# Patient Record
Sex: FEMALE | URBAN - METROPOLITAN AREA
[De-identification: names, ages, dates, MRNs, and addresses within clinical notes are randomized per-mention and may not be internally consistent; named-entity substitution may affect disease eponyms.]

---

## 2019-04-19 ENCOUNTER — OFFICE VISIT (OUTPATIENT)
Dept: URBAN - METROPOLITAN AREA CLINIC 22 | Facility: CLINIC | Age: 79
End: 2019-04-19
Payer: MEDICARE

## 2019-04-19 DIAGNOSIS — H53.123 TRANSIENT VISUAL LOSS, BILATERAL: Primary | ICD-10-CM

## 2019-04-19 DIAGNOSIS — H25.13 AGE-RELATED NUCLEAR CATARACT, BILATERAL: ICD-10-CM

## 2019-04-19 DIAGNOSIS — H04.123 DRY EYE SYNDROME OF BILATERAL LACRIMAL GLANDS: ICD-10-CM

## 2019-04-19 PROCEDURE — 92004 COMPRE OPH EXAM NEW PT 1/>: CPT | Performed by: OPTOMETRIST

## 2019-04-19 ASSESSMENT — INTRAOCULAR PRESSURE
OD: 13
OS: 18

## 2019-04-19 NOTE — IMPRESSION/PLAN
Impression: Age-related nuclear cataract, bilateral: H25.13. Plan: Discussed risks and benefits of cataract surgery with patient today. Patient will continue to monitor at this time  any vision changes or increase in glare symptoms. Patient may want to update her glasses RX. Patient told she could benefit from refraction if not then she could consider cataract surgery. Patient will return within the next month if she is interested.

## 2019-04-19 NOTE — IMPRESSION/PLAN
Impression: Transient visual loss, bilateral: H53.123. Plan: Discussed today's findings. Pt was several episodes of transient visual loss. Pt with history of TIA. Referred back to PCP and continue with her Eliquis.

## 2019-04-19 NOTE — IMPRESSION/PLAN
Impression: Dry eye syndrome of bilateral lacrimal glands: H04.123. Plan: Explained that there are three layers of the tear film (oil, mucous and water) and an imbalance in any of the layers can result in dry eyes. This can result in decreased or fluctuating vision. Pt reviewed that there is no cure and daily maintenance is needed. Artificial Tears QOD or more OU is recommended. AFT's can be purchased OTC. Discussed with the patient they may use them as often as needed.

## 2021-01-25 ENCOUNTER — OFFICE VISIT (OUTPATIENT)
Dept: URBAN - METROPOLITAN AREA CLINIC 22 | Facility: CLINIC | Age: 81
End: 2021-01-25
Payer: MEDICARE

## 2021-01-25 DIAGNOSIS — H43.813 VITREOUS DEGENERATION, BILATERAL: ICD-10-CM

## 2021-01-25 DIAGNOSIS — H25.813 BILATERAL COMBINED FORMS OF AGE-RELATED CATARACTS: Primary | ICD-10-CM

## 2021-01-25 PROCEDURE — 99214 OFFICE O/P EST MOD 30 MIN: CPT | Performed by: OPTOMETRIST

## 2021-01-25 ASSESSMENT — INTRAOCULAR PRESSURE
OD: 10
OS: 18